# Patient Record
Sex: MALE | Race: WHITE
[De-identification: names, ages, dates, MRNs, and addresses within clinical notes are randomized per-mention and may not be internally consistent; named-entity substitution may affect disease eponyms.]

---

## 2018-09-16 ENCOUNTER — HOSPITAL ENCOUNTER (EMERGENCY)
Dept: HOSPITAL 89 - ER | Age: 28
Discharge: HOME | End: 2018-09-16
Payer: COMMERCIAL

## 2018-09-16 VITALS — DIASTOLIC BLOOD PRESSURE: 65 MMHG | SYSTOLIC BLOOD PRESSURE: 125 MMHG

## 2018-09-16 DIAGNOSIS — S62.617A: Primary | ICD-10-CM

## 2018-09-16 PROCEDURE — 26725 TREAT FINGER FRACTURE EACH: CPT

## 2018-09-16 PROCEDURE — 99283 EMERGENCY DEPT VISIT LOW MDM: CPT

## 2018-09-16 NOTE — RADIOLOGY IMAGING REPORT
FACILITY: [facility] - [place] 

 

PATIENT NAME: Liu Elias

: 1990

MR: [id number]

V: [id number]

EXAM DATE: 

ORDERING PHYSICIAN: DOMINIC VILLEGAS

TECHNOLOGIST: 

 

Location: [facility] - [place]

Patient: Liu Elias

: 1990

MRN: [id number]

Visit/Account:[id number]

Date of Sevice:  2018

 

ACCESSION #: [id number]

 

INDICATION:

 

EXAM DATE:  2018 4:29 AM

 

COMPARISON: Same-day radiographs.

 

FINDINGS:

3 views of the left little finger. Mineralization is normal.  Redemonstration mildly comminuted fract
ure through the base of the proximal phalanx.  Alignment is now near-anatomic.  Persistent surroundin
g soft tissue swelling.

 

IMPRESSION:  Near-anatomic postreduction alignment of the left hand little finger proximal phalanx ba
se.

 

Report Dictated By: Jose Zavala MD at 2018 4:50 AM

 

Report E-Signed By: Jose Zavala MD  at 2018 4:52 AM

 

WSN:[id number]

## 2018-09-16 NOTE — RADIOLOGY IMAGING REPORT
FACILITY: [facility] - [place] 

 

PATIENT NAME: Liu Elias

: 1990

MR: [id number]

V: [id number]

EXAM DATE: 

ORDERING PHYSICIAN: DOMINIC VILLEGAS

TECHNOLOGIST: 

 

Location: [facility]

Patient: Liu Elias

: 1990

MRN: [id number]

Visit/Account:[id number]

Date of Sevice:  2018

 

ACCESSION #: [id number]

 

INDICATION:  finger injury

 

EXAM DATE:  2018 3:56 AM

 

COMPARISON: None.

 

FINDINGS:

3 views left small finger.  There is a mildly comminuted nondisplaced fracture through the base of th
e proximal phalanx which may extend to the articular surface.  Surrounding soft tissue swelling.  No 
additional acute fracture or dislocation.

 

IMPRESSION:  Comminuted, mildly displaced and possibly intra-articular fracture through the base of t
he left hand 5th proximal phalanx.

 

Report Dictated By: Jose Zavala MD at 2018 4:29 AM

 

Report E-Signed By: Jose Zavala MD  at 2018 4:31 AM

 

WSN:[id number]

## 2018-09-16 NOTE — ER REPORT
History and Physical


Time Seen By MD:  03:50


HPI/ROS


CHIEF COMPLAINT: finger injury





HISTORY OF PRESENT ILLNESS: This is a 28 year old male. He was messing around 


tonight, and jammed his left 5th finger. Has swelling and pain proximal finger. 


Has normal sensation. Can move it but it hurts.


Allergies:  


Coded Allergies:  


     No Known Drug Allergies (Unverified , 9/16/18)


Home Meds


No Active Prescriptions or Reported Meds


Reviewed Nurses Notes:  Yes


Constitutional





Vital Sign - Last 24 Hours








 9/16/18





 03:49


 


Temp 97.9


 


Pulse 102


 


Resp 14


 


B/P (MAP) 134/79


 


Pulse Ox 94


 


O2 Delivery Room Air








Physical Exam


General: Alert, no distress.


Musculoskeletal: Pain and swelling distal phalanx 5th finger left. No pain over 


middle or distal or over metacarpal.


Skin: No breakdown.


Neuro: normal sensation.


Cardiovascular: Normal capillary refill.





Medical Decision Making


EKG/Imaging


Imaging


INDICATION:  finger injury


 


EXAM DATE:  9/16/2018 3:56 AM


 


COMPARISON: None.


 


FINDINGS:


3 views left small finger.  There is a mildly comminuted nondisplaced fracture 


through the base of the proximal phalanx which may extend to the articular 


surface.  Surrounding soft tissue swelling.  No additional acute fracture or 


dislocation.


 


IMPRESSION:  Comminuted, mildly displaced and possibly intra-articular fracture 


through the base of the left hand 5th proximal phalanx.


 


Report Dictated By: Jose Zavala MD at 9/16/2018 4:29 AM








INDICATION:


 


EXAM DATE:  9/16/2018 4:29 AM


 


COMPARISON: Same-day radiographs.


 


FINDINGS:


3 views of the left little finger. Mineralization is normal.  Redemonstration 


mildly comminuted fracture through the base of the proximal phalanx.  Alignment 


is now near-anatomic.  Persistent surrounding soft tissue swelling.


 


IMPRESSION:  Near-anatomic postreduction alignment of the left hand little 


finger proximal phalanx base.


 


Report Dictated By: Jose Zavala MD at 9/16/2018 4:50 AM





ED Course/Re-evaluation


ED Course


Imaging obtained, showing fracture of the proximal phalanx with some 


angulation/displacement. Digital block done and then was able to improve the 


positioning of the finger. Buddy taped to the 4th finger. Ibuprofen for pain. 


Recommended follow-up with orthopedic surgery for re-evaluation


Decision to Disposition Date:  Sep 16, 2018


Decision to Disposition Time:  04:54





Depart


Departure


Latest Vital Signs





Vital Signs








  Date Time  Temp Pulse Resp B/P (MAP) Pulse Ox O2 Delivery O2 Flow Rate FiO2


 


9/16/18 03:49 97.9 102 14 134/79 94 Room Air  








Impression:  


   Primary Impression:  


   Proximal phalanx fracture of finger


Condition:  Improved


Disposition:  HOME OR SELF-CARE


New Scripts


No Active Prescriptions or Reported Meds


Patient Instructions:  Finger Fracture (ED)





Additional Instructions:  


Ibuprofen 200mg over the counter tablets, take 4 tablets every 6-8 hours as 


needed for pain.


Buddy tape the 5th and 4th fingers.


Call and schedule a follow-up with orthopedic surgery on Monday.


Apply ice every hour or so for about 10-15 minutes at a time.





Problem Qualifiers








   Primary Impression:  


   Proximal phalanx fracture of finger


   Encounter type:  initial encounter  Finger:  little finger  Fracture type:  


   closed  Fracture alignment:  displaced  Laterality:  left  Qualified Codes:  


   S62.617A - Displaced fracture of proximal phalanx of left little finger, 


   initial encounter for closed fracture








DOMINIC VILLEGAS MD             Sep 16, 2018 03:50